# Patient Record
Sex: FEMALE | URBAN - METROPOLITAN AREA
[De-identification: names, ages, dates, MRNs, and addresses within clinical notes are randomized per-mention and may not be internally consistent; named-entity substitution may affect disease eponyms.]

---

## 2024-04-18 ENCOUNTER — APPOINTMENT (RX ONLY)
Dept: URBAN - METROPOLITAN AREA CLINIC 74 | Facility: CLINIC | Age: 34
Setting detail: DERMATOLOGY
End: 2024-04-18

## 2024-04-18 DIAGNOSIS — L70.0 ACNE VULGARIS: ICD-10-CM

## 2024-04-18 PROCEDURE — ? PRESCRIPTION

## 2024-04-18 PROCEDURE — ? PATIENT SPECIFIC COUNSELING

## 2024-04-18 PROCEDURE — ? TREATMENT REGIMEN

## 2024-04-18 PROCEDURE — ? COUNSELING

## 2024-04-18 PROCEDURE — 99203 OFFICE O/P NEW LOW 30 MIN: CPT

## 2024-04-18 RX ORDER — PHARMACY COMPOUNDING ACCESSORY
EACH MISCELLANEOUS
Qty: 30 | Refills: 1 | Status: ERX | COMMUNITY
Start: 2024-04-18

## 2024-04-18 RX ADMIN — Medication: at 00:00

## 2024-04-18 ASSESSMENT — LOCATION ZONE DERM: LOCATION ZONE: FACE

## 2024-04-18 ASSESSMENT — LOCATION DETAILED DESCRIPTION DERM: LOCATION DETAILED: LEFT CENTRAL MALAR CHEEK

## 2024-04-18 ASSESSMENT — LOCATION SIMPLE DESCRIPTION DERM: LOCATION SIMPLE: LEFT CHEEK

## 2024-04-18 NOTE — PROCEDURE: TREATMENT REGIMEN
Initiate Treatment: Modified kilograms qhs: Tret 0.05%-HQ 4%-fluoc 0.01% apply a pea sized amount to face qhs x 3 months
Detail Level: Zone
Samples Given: La Roche Possay SA 2% cleanser
Otc Regimen: Panoxyl or SA cleanser three times a week

## 2024-04-18 NOTE — HPI: PIMPLES (ACNE)
How Severe Is Your Acne?: moderate
Is This A New Presentation, Or A Follow-Up?: Acne
What Type Of Note Output Would You Prefer (Optional)?: Standard Output
Additional Comments (Use Complete Sentences): Pt reports using tretinoin 0.045% cream on and off for about 6 months

## 2024-04-18 NOTE — PROCEDURE: PATIENT SPECIFIC COUNSELING
- Pt reports using tretinoin 0.045% cream on and off for about 6 months  - helps acne but still not prevent PIH\\n- pt denies any hormonal changes, any stress or new medications/birth control, stressors, etc\\n- pt reports she flares right before cycles, pt also concern about past inflammatory acne scarring \\n- discussed that tretinoin is meant to be use long term until acne clear or not bothersome anymore \\n- discussed low and high dose oral abx vs spironolactone vs ocp vs accutane, pt not candidate for accutane at this time, pt would like to avoid oral medication at this time \\n- pt reports she took spironolactone during Covid, does not think it helped, does not know dose she took, discussed spironolactone is dose dependent and sometimes need higher dose \\n- pt asked about chemical peels, discussed we can do chemical peels as acne is clear and while on longer term medication mgmt\\n- plan to start spironolactone at f/u or in between visits if no improvement with compound bleaching cream \\n-skin care\\nRTC 2-3 months
Detail Level: Detailed

## 2024-04-18 NOTE — PROCEDURE: COUNSELING

## 2024-07-10 ENCOUNTER — APPOINTMENT (RX ONLY)
Dept: URBAN - METROPOLITAN AREA CLINIC 73 | Facility: CLINIC | Age: 34
Setting detail: DERMATOLOGY
End: 2024-07-10

## 2024-07-10 VITALS — DIASTOLIC BLOOD PRESSURE: 56 MMHG | SYSTOLIC BLOOD PRESSURE: 97 MMHG

## 2024-07-10 DIAGNOSIS — L70.0 ACNE VULGARIS: ICD-10-CM | Status: INADEQUATELY CONTROLLED

## 2024-07-10 PROCEDURE — ? PATIENT SPECIFIC COUNSELING

## 2024-07-10 PROCEDURE — ? COUNSELING

## 2024-07-10 PROCEDURE — ? TREATMENT REGIMEN

## 2024-07-10 PROCEDURE — 99214 OFFICE O/P EST MOD 30 MIN: CPT

## 2024-07-10 PROCEDURE — ? PRESCRIPTION

## 2024-07-10 RX ORDER — PHARMACY COMPOUNDING ACCESSORY
EACH MISCELLANEOUS
Qty: 30 | Refills: 1 | Status: ERX

## 2024-07-10 RX ORDER — SPIRONOLACTONE 25 MG/1
TABLET, FILM COATED ORAL
Qty: 150 | Refills: 0 | Status: ERX | COMMUNITY
Start: 2024-07-10

## 2024-07-10 RX ADMIN — SPIRONOLACTONE: 25 TABLET, FILM COATED ORAL at 00:00

## 2024-07-10 ASSESSMENT — LOCATION DETAILED DESCRIPTION DERM: LOCATION DETAILED: LEFT CENTRAL MALAR CHEEK

## 2024-07-10 ASSESSMENT — LOCATION ZONE DERM: LOCATION ZONE: FACE

## 2024-07-10 ASSESSMENT — LOCATION SIMPLE DESCRIPTION DERM: LOCATION SIMPLE: LEFT CHEEK

## 2024-07-10 NOTE — PROCEDURE: PATIENT SPECIFIC COUNSELING
- pt declined oral medications at this time such as ocps and spironolactone due to risk of side effects; declining oral antibx also; not typical candidate for isotret\\n-tried rosie during covid- not recall dosing or efficacy or se's\\n- pt bothered by acne scars (mainly PIH/PIE)\\n- disc prevention of acne is first step before treating acne scarring\\n- can increase topicals but pt should monitor for sun sensitivity and irritation\\n- disc starting spironolactone and trying for at least 6months\\n- spironolactone se’s potassium elevation, bp changes, dizziness, headachess, breast tenderness\\n- pt denies planning pregnancy
Detail Level: Detailed

## 2024-07-10 NOTE — PROCEDURE: TREATMENT REGIMEN
Initiate Treatment: Spironolactone 25mg take 1 tablet  po qd x 1 month, then take 2 tablets po qd x 2 months\\n\\nTretinoin 0.1%/HQ 10%/fluo 0.01% apply a pea sized amount to face qhs x 3 months
Detail Level: Zone
Otc Regimen: Panoxyl 4%, wash face three times a week

## 2024-09-11 ENCOUNTER — APPOINTMENT (RX ONLY)
Dept: URBAN - METROPOLITAN AREA CLINIC 73 | Facility: CLINIC | Age: 34
Setting detail: DERMATOLOGY
End: 2024-09-11

## 2024-09-11 DIAGNOSIS — L70.0 ACNE VULGARIS: ICD-10-CM | Status: IMPROVED

## 2024-09-11 PROCEDURE — ? TREATMENT REGIMEN

## 2024-09-11 PROCEDURE — 99214 OFFICE O/P EST MOD 30 MIN: CPT

## 2024-09-11 PROCEDURE — ? COUNSELING

## 2024-09-11 PROCEDURE — ? PRESCRIPTION

## 2024-09-11 PROCEDURE — ? PATIENT SPECIFIC COUNSELING

## 2024-09-11 RX ORDER — PHARMACY COMPOUNDING ACCESSORY
EACH MISCELLANEOUS
Qty: 30 | Refills: 1 | Status: ERX

## 2024-09-11 RX ORDER — SPIRONOLACTONE 25 MG/1
TABLET, FILM COATED ORAL
Qty: 270 | Refills: 0 | Status: ERX

## 2024-09-11 ASSESSMENT — LOCATION SIMPLE DESCRIPTION DERM: LOCATION SIMPLE: LEFT CHEEK

## 2024-09-11 ASSESSMENT — LOCATION ZONE DERM: LOCATION ZONE: FACE

## 2024-09-11 ASSESSMENT — LOCATION DETAILED DESCRIPTION DERM: LOCATION DETAILED: LEFT CENTRAL MALAR CHEEK

## 2024-09-11 NOTE — PROCEDURE: PATIENT SPECIFIC COUNSELING
pt appears improved today; pt was uncertain\\n-Disc r/b/sed increasing spironolactone dosage\\nPt opted for increasing dosage from 25 to 50mg daily; monitor\\nDisc continuing tretinoin 0.1%/HQ10%/fluo 0.01% cream *2 more mo\\n-skin care and spf 30+/sun protex\\nrtc 2-3mo
Detail Level: Detailed

## 2024-09-11 NOTE — PROCEDURE: TREATMENT REGIMEN
Continue Regimen: Tretinoin 0.1%/HQ 10%/fluo 0.01% apply a pea sized amount to face qhs x 2 additional mo\\nSpirnolactone 25mg: take 2-3 tab po qd (50mg daily *1mo, and if feels ok and still w/ breakouts, can incr to 75mg daily as tolerated)
Detail Level: Zone

## 2024-12-04 ENCOUNTER — APPOINTMENT (RX ONLY)
Dept: URBAN - METROPOLITAN AREA CLINIC 73 | Facility: CLINIC | Age: 34
Setting detail: DERMATOLOGY
End: 2024-12-04

## 2024-12-04 DIAGNOSIS — L28.1 PRURIGO NODULARIS: ICD-10-CM

## 2024-12-04 DIAGNOSIS — L70.0 ACNE VULGARIS: ICD-10-CM

## 2024-12-04 PROCEDURE — ? PATIENT SPECIFIC COUNSELING

## 2024-12-04 PROCEDURE — ? PRESCRIPTION

## 2024-12-04 PROCEDURE — ? TREATMENT REGIMEN

## 2024-12-04 PROCEDURE — ? INTRALESIONAL KENALOG

## 2024-12-04 PROCEDURE — 11900 INJECT SKIN LESIONS </W 7: CPT

## 2024-12-04 PROCEDURE — 99214 OFFICE O/P EST MOD 30 MIN: CPT | Mod: 25

## 2024-12-04 PROCEDURE — ? COUNSELING

## 2024-12-04 RX ORDER — CLOBETASOL PROPIONATE 0.5 MG/G
OINTMENT TOPICAL
Qty: 15 | Refills: 0 | Status: ERX | COMMUNITY
Start: 2024-12-04

## 2024-12-04 RX ORDER — TAZAROTENE 0.45 MG/G
LOTION TOPICAL
Qty: 45 | Refills: 1 | Status: ERX | COMMUNITY
Start: 2024-12-04

## 2024-12-04 RX ORDER — PHARMACY COMPOUNDING ACCESSORY
EACH MISCELLANEOUS
Qty: 30 | Refills: 1 | Status: ERX

## 2024-12-04 RX ADMIN — CLOBETASOL PROPIONATE: 0.5 OINTMENT TOPICAL at 00:00

## 2024-12-04 RX ADMIN — TAZAROTENE: 0.45 LOTION TOPICAL at 00:00

## 2024-12-04 ASSESSMENT — LOCATION DETAILED DESCRIPTION DERM
LOCATION DETAILED: RIGHT ELBOW
LOCATION DETAILED: LEFT ELBOW
LOCATION DETAILED: LEFT CENTRAL MALAR CHEEK
LOCATION DETAILED: LEFT PROXIMAL DORSAL FOREARM

## 2024-12-04 ASSESSMENT — LOCATION SIMPLE DESCRIPTION DERM
LOCATION SIMPLE: RIGHT ELBOW
LOCATION SIMPLE: LEFT FOREARM
LOCATION SIMPLE: LEFT ELBOW
LOCATION SIMPLE: LEFT CHEEK

## 2024-12-04 ASSESSMENT — LOCATION ZONE DERM
LOCATION ZONE: ARM
LOCATION ZONE: FACE

## 2024-12-04 NOTE — PROCEDURE: INTRALESIONAL KENALOG
Kenalog Preparation: Kenalog
How Many Mls Were Removed From The 10 Mg/Ml (5ml) Vial When Preparing The Injectable Solution?: 0
Bill For Wasted Drug (Kenalog)?: no
Consent: The risks of atrophy were reviewed with the patient.
Kenalog Type Of Vial: Multiple Dose
Administered By (Optional): sonal mg
Medical Necessity Clause: This procedure was medically necessary because the lesions that were treated were:\\na/s did not help/not successful
Concentration Of Kenalog Solution Injected (Mg/Ml): 5.0
Total Volume (Ccs): 0.3
Detail Level: Detailed
Validate Note Data When Using Inventory: Yes

## 2024-12-04 NOTE — PROCEDURE: COUNSELING

## 2024-12-04 NOTE — PROCEDURE: PATIENT SPECIFIC COUNSELING
Pt reported fewer active breakouts on treatment regimen (rosie 50-75mg daily and modified kligmans), PIH still remained \\nPt stated she did not want anymore rosie as she is unsure if this medication was useful for her- wanted to take break from it\\n-modified kligmans sl helpful but still w/ PIH\\nwill change: Prescribed arazlo qhs\\nPrescribed 12% HQ/6%KA compound cream bid*3mo\\n-spf 30+/sun protex\\nDisc vi peel as future option if prescription creams do not work\\nRtc 3 months
Detail Level: Detailed
Pt has habit of picking at areas\\nDisc Cortisone inj r/b/seds vs steroid cream and band aid r/b/seds\\nPt chose to get ILK today and use steroid cream at home

## 2024-12-04 NOTE — PROCEDURE: TREATMENT REGIMEN
Initiate Treatment: pharmacy compounding accessory \\nQuantity: 30.0 g\\nSi% HQ/6% KA apply a pea sized amount to face bid x 3 months\\n\\nArazlo 0.045 % lotion \\nQuantity: 45.0 g  Days Supply: 30\\nSig: Apply a pea sized amount to face qhs/qohs as tolerated
Detail Level: Zone
Initiate Treatment: clobetasol 0.05 % topical ointment \\nQuantity: 15.0 g  Days Supply: 30\\nSig: Apply to affected areas on elbows bid x 10 days. Use occlusion on arms

## 2025-03-19 ENCOUNTER — APPOINTMENT (OUTPATIENT)
Dept: URBAN - METROPOLITAN AREA CLINIC 73 | Facility: CLINIC | Age: 35
Setting detail: DERMATOLOGY
End: 2025-03-19

## 2025-03-19 DIAGNOSIS — L70.0 ACNE VULGARIS: ICD-10-CM

## 2025-03-19 DIAGNOSIS — L28.1 PRURIGO NODULARIS: ICD-10-CM

## 2025-03-19 PROCEDURE — ? PATIENT SPECIFIC COUNSELING

## 2025-03-19 PROCEDURE — 99214 OFFICE O/P EST MOD 30 MIN: CPT | Mod: 25

## 2025-03-19 PROCEDURE — ? PRESCRIPTION

## 2025-03-19 PROCEDURE — ? INTRALESIONAL KENALOG

## 2025-03-19 PROCEDURE — 11900 INJECT SKIN LESIONS </W 7: CPT

## 2025-03-19 PROCEDURE — ? TREATMENT REGIMEN

## 2025-03-19 PROCEDURE — ? SEPARATE AND IDENTIFIABLE DOCUMENTATION

## 2025-03-19 PROCEDURE — ? COUNSELING

## 2025-03-19 RX ORDER — PHARMACY COMPOUNDING ACCESSORY
EACH MISCELLANEOUS
Qty: 30 | Refills: 1 | Status: ERX | COMMUNITY
Start: 2025-03-19

## 2025-03-19 RX ADMIN — Medication: at 00:00

## 2025-03-19 ASSESSMENT — LOCATION DETAILED DESCRIPTION DERM
LOCATION DETAILED: LEFT CENTRAL MALAR CHEEK
LOCATION DETAILED: RIGHT ELBOW
LOCATION DETAILED: LEFT PROXIMAL DORSAL FOREARM
LOCATION DETAILED: LEFT ELBOW

## 2025-03-19 ASSESSMENT — LOCATION ZONE DERM
LOCATION ZONE: FACE
LOCATION ZONE: ARM

## 2025-03-19 ASSESSMENT — LOCATION SIMPLE DESCRIPTION DERM
LOCATION SIMPLE: LEFT CHEEK
LOCATION SIMPLE: RIGHT ELBOW
LOCATION SIMPLE: LEFT ELBOW
LOCATION SIMPLE: LEFT FOREARM

## 2025-03-19 NOTE — PROCEDURE: PATIENT SPECIFIC COUNSELING
Pt had Liletta IUD inserted 2 weeks ago ago and started elizabeth 35 birth control pills from pcp in [place] 5d ago, reports worsening of acne in interim from last visit; has endometriosis for which got cyst removed from ovary about 3mo ago\\nDisc that it is too early to determine if acne is hormonal\\nPt is taking birth control Elizabeth-35 as well as the IUD for her endometriosis (?)- gyn aware\\nPt sees dr john in the [place] who prescribed the IUD, pt’s doctor from [facility] prescribed her Elizabeth-35\\nPt reports reports the last time she used bleaching compound was in December before she lost it\\nPt reports burning sensation with Arazlo, she was not able to tolerate it qhs\\nDisc r/b/se restarting 12%HQ/6% KA  compound bid*2-3mo and pt can mix the arazlo with moisturizers and use qohs\\nDisc that chemical peels are not recommended at this time but she can schedule consult prn; aware we prefer acne to be under better control prior to consideration of peels\\nrtc 3-6mo
Detail Level: Detailed
Disc r/b/se ILK injections, performed today\\nEmphasized importance of not picking at the areas

## 2025-03-19 NOTE — PROCEDURE: TREATMENT REGIMEN
Continue Regimen: pharmacy compounding accessory \\nQuantity: 30.0 g\\nSi% HQ/6% KA apply a pea sized amount to face bid x 3 months\\n\\nArazlo 0.045 % lotion \\nQuantity: 45.0 g  Days Supply: 30\\nSig: Apply a pea sized amount to face qhs/qohs as tolerated
Detail Level: Zone

## 2025-03-19 NOTE — PROCEDURE: INTRALESIONAL KENALOG
Kenalog Preparation: Kenalog
How Many Mls Were Removed From The 10 Mg/Ml (5ml) Vial When Preparing The Injectable Solution?: 0
Bill For Wasted Drug (Kenalog)?: no
Consent: The risks of atrophy were reviewed with the patient.
Kenalog Type Of Vial: Multiple Dose
Administered By (Optional): sonal mg
Medical Necessity Clause: This procedure was medically necessary because the lesions that were treated were:\\na/s did not help/not successful
Concentration Of Kenalog Solution Injected (Mg/Ml): 10.0
Total Volume (Ccs): 0.5
Detail Level: Detailed
Validate Note Data When Using Inventory: Yes